# Patient Record
Sex: FEMALE | Race: WHITE | NOT HISPANIC OR LATINO | Employment: FULL TIME | URBAN - METROPOLITAN AREA
[De-identification: names, ages, dates, MRNs, and addresses within clinical notes are randomized per-mention and may not be internally consistent; named-entity substitution may affect disease eponyms.]

---

## 2017-07-31 ENCOUNTER — HOSPITAL ENCOUNTER (OUTPATIENT)
Dept: FAMILY MEDICINE CLINIC | Facility: CLINIC | Age: 42
Setting detail: SPECIMEN
Discharge: HOME | End: 2017-07-31
Attending: FAMILY MEDICINE | Admitting: FAMILY MEDICINE

## 2017-07-31 LAB
BASOPHILS # BLD AUTO: 0.1 10*3/UL (ref 0–0.2)
BASOPHILS NFR BLD AUTO: 1 % (ref 0–2)
BILIRUB UR QL STRIP: NEGATIVE MG/DL
CASTS URNS QL MICRO: NORMAL /[LPF]
CHOLEST SERPL-MCNC: 257 MG/DL
CHOLEST/HDLC SERPL: 4.1 {RATIO}
COLOR UR: YELLOW
CONV BACTERIA IN URINE MICRO: NEGATIVE
CONV CLARITY OF URINE: CLEAR
CONV HYALINE CASTS IN URINE MICRO: 0 /[LPF] (ref 0–5)
CONV LDL CHOLESTEROL DIRECT: 150 MG/DL (ref 0–100)
CONV PROTEIN IN URINE BY AUTOMATED TEST STRIP: NEGATIVE MG/DL
CONV SMALL ROUND CELLS: NORMAL /[HPF]
CONV UROBILINOGEN IN URINE BY AUTOMATED TEST STRIP: 0.2 MG/DL
CULTURE INDICATED?: NORMAL
DIFFERENTIAL METHOD BLD: (no result)
EOSINOPHIL # BLD AUTO: 0.1 10*3/UL (ref 0–0.3)
EOSINOPHIL # BLD AUTO: 1 % (ref 0–3)
ERYTHROCYTE [DISTWIDTH] IN BLOOD BY AUTOMATED COUNT: 13.6 % (ref 11.5–14.5)
ERYTHROCYTE [SEDIMENTATION RATE] IN BLOOD BY WESTERGREN METHOD: 18 MM/HR (ref 0–20)
GLUCOSE UR QL: NEGATIVE MG/DL
HCT VFR BLD AUTO: 41.9 % (ref 35–49)
HDLC SERPL-MCNC: 63 MG/DL
HGB BLD-MCNC: 14 G/DL (ref 12–15)
HGB UR QL STRIP: NEGATIVE
KETONES UR QL STRIP: NEGATIVE MG/DL
LDLC/HDLC SERPL: 2.4 {RATIO}
LEUKOCYTE ESTERASE UR QL STRIP: NEGATIVE
LIPID INTERPRETATION: ABNORMAL
LYMPHOCYTES # BLD AUTO: 2.2 10*3/UL (ref 0.8–4.8)
LYMPHOCYTES NFR BLD AUTO: 34 % (ref 18–42)
MCH RBC QN AUTO: 30.4 PG (ref 26–32)
MCHC RBC AUTO-ENTMCNC: 33.3 G/DL (ref 32–36)
MCV RBC AUTO: 91.2 FL (ref 80–94)
MONOCYTES # BLD AUTO: 0.7 10*3/UL (ref 0.1–1.3)
MONOCYTES NFR BLD AUTO: 10 % (ref 2–11)
NEUTROPHILS # BLD AUTO: 3.5 10*3/UL (ref 2.3–8.6)
NEUTROPHILS NFR BLD AUTO: 54 % (ref 50–75)
NITRITE UR QL STRIP: NEGATIVE
NRBC BLD AUTO-RTO: 0 /100{WBCS}
NRBC/RBC NFR BLD MANUAL: 0 10*3/UL
PH UR STRIP.AUTO: 7 [PH] (ref 4.5–8)
PLATELET # BLD AUTO: 259 10*3/UL (ref 150–450)
PMV BLD AUTO: 9.6 FL (ref 7.4–10.4)
RBC # BLD AUTO: 4.6 10*6/UL (ref 4–5.4)
RBC #/AREA URNS HPF: 0 /[HPF] (ref 0–3)
SP GR UR: 1.01 (ref 1–1.03)
SPERM URNS QL MICRO: NORMAL /[HPF]
SQUAMOUS SPT QL MICRO: 1 /[HPF] (ref 0–5)
TRIGL SERPL-MCNC: 254 MG/DL
TSH SERPL-ACNC: 1.5 UIU/ML (ref 0.34–5.6)
UNIDENT CRYS URNS QL MICRO: NORMAL /[HPF]
VLDLC SERPL CALC-MCNC: 44.1 MG/DL
WBC # BLD AUTO: 6.5 10*3/UL (ref 4.5–11.5)
WBC #/AREA URNS HPF: 1 /[HPF] (ref 0–5)
YEAST SPEC QL WET PREP: NORMAL /[HPF]

## 2017-08-02 LAB
ANA SER QL IA: NORMAL

## 2018-06-13 ENCOUNTER — HOSPITAL ENCOUNTER (OUTPATIENT)
Dept: PHYSICAL THERAPY | Facility: HOSPITAL | Age: 43
Setting detail: RECURRING SERIES
Discharge: HOME | End: 2018-08-01
Attending: OBSTETRICS & GYNECOLOGY | Admitting: OBSTETRICS & GYNECOLOGY

## 2019-05-24 ENCOUNTER — CONVERSION ENCOUNTER (OUTPATIENT)
Dept: FAMILY MEDICINE CLINIC | Facility: CLINIC | Age: 44
End: 2019-05-24

## 2019-05-24 ENCOUNTER — HOSPITAL ENCOUNTER (OUTPATIENT)
Dept: FAMILY MEDICINE CLINIC | Facility: CLINIC | Age: 44
Setting detail: SPECIMEN
Discharge: HOME | End: 2019-05-24
Attending: FAMILY MEDICINE | Admitting: FAMILY MEDICINE

## 2019-05-24 LAB
ALBUMIN SERPL-MCNC: 4 G/DL (ref 3.5–4.8)
ALBUMIN/GLOB SERPL: 1.4 {RATIO} (ref 1–1.7)
ALP SERPL-CCNC: 39 IU/L (ref 32–91)
ALT SERPL-CCNC: 23 IU/L (ref 14–54)
ANION GAP SERPL CALC-SCNC: 11.2 MMOL/L (ref 10–20)
AST SERPL-CCNC: 22 IU/L (ref 15–41)
BASOPHILS # BLD AUTO: 0.1 10*3/UL (ref 0–0.2)
BASOPHILS NFR BLD AUTO: 1 % (ref 0–2)
BILIRUB SERPL-MCNC: 0.6 MG/DL (ref 0.3–1.2)
BILIRUB UR QL STRIP: NEGATIVE MG/DL
BUN SERPL-MCNC: 12 MG/DL (ref 8–20)
BUN/CREAT SERPL: 15 (ref 5.4–26.2)
CALCIUM SERPL-MCNC: 8.9 MG/DL (ref 8.9–10.3)
CASTS URNS QL MICRO: NORMAL /[LPF]
CHLORIDE SERPL-SCNC: 106 MMOL/L (ref 101–111)
CHOLEST SERPL-MCNC: 213 MG/DL
CHOLEST/HDLC SERPL: 4.2 {RATIO}
COLOR UR: YELLOW
CONV BACTERIA IN URINE MICRO: NEGATIVE
CONV CLARITY OF URINE: CLEAR
CONV CO2: 24 MMOL/L (ref 22–32)
CONV HYALINE CASTS IN URINE MICRO: 1 /[LPF] (ref 0–5)
CONV LDL CHOLESTEROL DIRECT: 139 MG/DL (ref 0–100)
CONV PROTEIN IN URINE BY AUTOMATED TEST STRIP: NEGATIVE MG/DL
CONV SMALL ROUND CELLS: NORMAL /[HPF]
CONV TOTAL PROTEIN: 6.8 G/DL (ref 6.1–7.9)
CONV UROBILINOGEN IN URINE BY AUTOMATED TEST STRIP: 0.2 MG/DL
CREAT UR-MCNC: 0.8 MG/DL (ref 0.4–1)
CULTURE INDICATED?: NORMAL
DIFFERENTIAL METHOD BLD: (no result)
EOSINOPHIL # BLD AUTO: 0.1 10*3/UL (ref 0–0.3)
EOSINOPHIL # BLD AUTO: 2 % (ref 0–3)
ERYTHROCYTE [DISTWIDTH] IN BLOOD BY AUTOMATED COUNT: 13.3 % (ref 11.5–14.5)
GLOBULIN UR ELPH-MCNC: 2.8 G/DL (ref 2.5–3.8)
GLUCOSE SERPL-MCNC: 97 MG/DL (ref 65–99)
GLUCOSE UR QL: NEGATIVE MG/DL
HBA1C MFR BLD: 5.1 % (ref 0–5.6)
HCT VFR BLD AUTO: 40.8 % (ref 35–49)
HDLC SERPL-MCNC: 50 MG/DL
HGB BLD-MCNC: 13.7 G/DL (ref 12–15)
HGB UR QL STRIP: NEGATIVE
KETONES UR QL STRIP: NEGATIVE MG/DL
LDLC/HDLC SERPL: 2.8 {RATIO}
LEUKOCYTE ESTERASE UR QL STRIP: NEGATIVE
LIPID INTERPRETATION: ABNORMAL
LYMPHOCYTES # BLD AUTO: 2 10*3/UL (ref 0.8–4.8)
LYMPHOCYTES NFR BLD AUTO: 31 % (ref 18–42)
MCH RBC QN AUTO: 31.6 PG (ref 26–32)
MCHC RBC AUTO-ENTMCNC: 33.7 G/DL (ref 32–36)
MCV RBC AUTO: 94 FL (ref 80–94)
MONOCYTES # BLD AUTO: 0.6 10*3/UL (ref 0.1–1.3)
MONOCYTES NFR BLD AUTO: 9 % (ref 2–11)
NEUTROPHILS # BLD AUTO: 3.7 10*3/UL (ref 2.3–8.6)
NEUTROPHILS NFR BLD AUTO: 57 % (ref 50–75)
NITRITE UR QL STRIP: NEGATIVE
NRBC BLD AUTO-RTO: 0 /100{WBCS}
NRBC/RBC NFR BLD MANUAL: 0 10*3/UL
PH UR STRIP.AUTO: 6 [PH] (ref 4.5–8)
PLATELET # BLD AUTO: 229 10*3/UL (ref 150–450)
PMV BLD AUTO: 10.2 FL (ref 7.4–10.4)
POTASSIUM SERPL-SCNC: 4.2 MMOL/L (ref 3.6–5.1)
RBC # BLD AUTO: 4.34 10*6/UL (ref 4–5.4)
RBC #/AREA URNS HPF: 1 /[HPF] (ref 0–3)
SODIUM SERPL-SCNC: 137 MMOL/L (ref 136–144)
SP GR UR: 1.02 (ref 1–1.03)
SPERM URNS QL MICRO: NORMAL /[HPF]
SQUAMOUS SPT QL MICRO: 1 /[HPF] (ref 0–5)
TRIGL SERPL-MCNC: 243 MG/DL
TSH SERPL-ACNC: 1.43 UIU/ML (ref 0.34–5.6)
UNIDENT CRYS URNS QL MICRO: NORMAL /[HPF]
VLDLC SERPL CALC-MCNC: 23.8 MG/DL
WBC # BLD AUTO: 6.5 10*3/UL (ref 4.5–11.5)
WBC #/AREA URNS HPF: 1 /[HPF] (ref 0–5)
YEAST SPEC QL WET PREP: NORMAL /[HPF]

## 2019-05-26 LAB — 25(OH)D3 SERPL-MCNC: 47 NG/ML (ref 30–100)

## 2019-06-04 VITALS
OXYGEN SATURATION: 98 % | WEIGHT: 167 LBS | HEART RATE: 74 BPM | SYSTOLIC BLOOD PRESSURE: 112 MMHG | DIASTOLIC BLOOD PRESSURE: 83 MMHG | RESPIRATION RATE: 16 BRPM

## 2019-06-06 NOTE — PROGRESS NOTES
Visit Type:  Annual Physical  Referring Provider:  Olga Carrington MD  Primary Provider:  Olga Carrington MD    CC:  well exam.    History of Present Illness:                 This is 45 y/o female who is coming in today for her annual wellness exam. She is doing well and she denies any issues. Patient denies any chest pain, shortness of breath, dizziness, headaches, nausea, vomiting, or diarrhea. Patient denies any   visual problems, weakness, numbness, or tingling. No swelling reported. Patient has good appetite and denies any weight loss. No rash reported. She denies any urinary issues.      Past Medical History:     Hemochromatosis mutation - Dr. Forbes     Seasonal Allergies     Anxiety Disorder/Depression - Dr. Perez     Hypertension     Headache, Migraine     IBS     [facility] per [facility] - pap and mammograms from there     Hyperlipidemia     Interstitial cystitis     Colonoscopy in 2008     GERD     Tail bone pain/Pelvic floor dysfunction     Tdap in 10/29/2017 - given in the pharmacy    Past Surgical History:     Reviewed history from 01/05/2017 and no changes required:        Appendectomy        Breast Surgery: Implants in 2004        Hiatal hernia w/ TIF 12/28/16     Family History Summary:      Reviewed history Last on 04/04/2018 and no changes required:05/24/2019  Other Family Member - Has FH Heart Disease - Entered On: 11/22/2016  Other Family Member - Has FH Diabetes - Entered On: 11/22/2016  Mother - Has Family History of High Cholesterol - Entered On: 7/16/2015  Mother - Has Family History of Heart Disease - Entered On: 7/16/2015  Mother - Has Family History of Diabetes - Entered On: 7/16/2015    General Comments - FH:  FH Depression  FH Diabetes  FH Heart Disease  FH Hypertension  FH High Cholesterol  FH Other Cancer  FH Psychiatric Care  FH Hemochromatosis (brother)    Social History:     Reviewed history from 11/22/2016 and no changes required:                No Children         Works at the business        Alcohol Use - yes                Smoking History:        Unknown if patient has ever smoked.        Risk Factors:     Smoked Tobacco Use:  Never smoker  Smokeless Tobacco Use:  Never  Passive smoke exposure:  no  Drug use:  no  Alcohol use:  yes  Exercise:  no  Seatbelt use:  100 %  Sun Exposure:  occasionally      Review of Systems     General       Denies fever, chills and fatigue.    Eyes       Denies double vision, eye irritation, vision loss - both eyes and blurring.    ENT       Denies earache, nasal congestion, hoarseness, sore throat and Post-nasal drainage.    CV       Denies near fainting, chest pain or discomfort, racing/skipping heart beats, lightheadedness, shortness of breath with exertion and palpitations.    Resp       Denies shortness of breath, chest discomfort and wheezing.    GI       Denies nausea, vomiting, abdominal pain and diarrhea.           Denies blood in urine, urinary frequency, urinary urgency and kidney pain.    MS       Denies joint pain, joint swelling, back pain, stiffness and arthritis.    Derm       Denies dryness, itching and rash.    Psych       Denies anxiety, mental problems and depression.    Endo       Denies cold intolerance, heat intolerance, excessive urination and excessive   thirst.    Heme       Denies bleeding, skin discoloration and fevers.    Allergy       Denies hives or rash and seasonal allergies.      Vital Signs:    Patient Profile:    44 Years Old Female  Height:     65 inches  Weight:     167 pounds  BMI:        27.79     O2 Sat:     98 %  Temp:       98.2 degrees F oral  Pulse rate: 74 / minute  Pulse rhythm:   regular  Resp:       16 per minute  BP Sittin / 83  (left arm)    Cuff size:  regular      Problems: Active problems were reviewed with the patient during this visit.  Medications: Medications were reviewed with the patient during this visit.  Allergies: Allergies were reviewed with the patient during this  visit.  No Known Allergy.        Vitals Entered By: [facility] Muniz [facility] (May 24, 2019 8:18 AM)      Physical Exam    General:      well developed, well nourished, in no acute distress.    Head:      normocephalic and atraumatic.    Eyes:      PERRL/EOM intact, conjunctiva and sclera clear with out nystagmus.    Ears:      TM's intact and clear with normal canals with grossly normal hearing.    Mouth:      no deformity or lesions with good dentition.    Neck:      no masses, thyromegaly, or abnormal cervical nodes.    Lungs:      clear to auscultation bilaterally. No crackles or wheezes noted, good air movement. No retractions or accessory muscle use.    Heart:      non-displaced PMI, chest non-tender; regular rate and rhythm, S1, S2 without murmurs, rubs, or gallops  Abdomen:      soft, non-tender, non-distended, positive bowel sounds. No rebound tenderness, no guarding. No organomegaly noted. Normal percussion.    Msk:      no deformity or scoliosis noted of thoracic or lumbar spine.    Extremities:      no pedal edema.    Neurologic:      CN II-XII gross intact.    Skin:      intact without lesions or rashes.    Cervical Nodes:      no significant adenopathy.    Psych:      alert and cooperative; normal mood and affect; normal attention span and concentration.        Blood Pressure:  Today's BP: 112/83 mm Hg    Labwork:   Most Recent Lab Results:   LDL: 150 mg/dL 07/31/2017  HbA1c: : 5.1 % 07/31/2017    Active Medications (reviewed today):  AMOXICILLIN 875 MG ORAL TABLET (AMOXICILLIN) Take one (1) tablet by mouth twice a day  MEDROL 4 MG ORAL TABLET THERAPY PACK (METHYLPREDNISOLONE) use as directed  TRIAMCINOLONE ACETONIDE 0.1 % EXTERNAL CREAM (TRIAMCINOLONE ACETONIDE) apply to the affected area BID as needed  VITAMIN D 2000 UNIT ORAL CAPSULE (CHOLECALCIFEROL) take 1 capsule PO once a day  GARLIQUE TABLET DELAYED RELEASE (GARLIC TBEC)   PHAZYME CAPSULE (SIMETHICONE CAPS)   MAGNESIUM CAPSULE (MAGNESIUM OXIDE CAPS)   GNP  GINGKO BILOBA EXTRACT CAPSULE (GINKGO BILOBA CAPS)   DAILY MULTIVITAMIN ORAL CAPSULE (MULTIPLE VITAMINS-MINERALS)   PRELIEF TABLET (CALCIUM GLYCEROPHOSPHATE TABS)   TRINTELLIX 10 MG ORAL TABLET (VORTIOXETINE HBR) Take 1 tablet by mouth daily  VITAMIN B-12 1000 MCG SUBLINGUAL TABLET SUBLINGUAL (CYANOCOBALAMIN) Take 1 tablet by mouth daily  FLONASE 50 MCG/ACT NASAL SUSPENSION (FLUTICASONE PROPIONATE) use 2 sprays to each nostril once a day  ZYRTEC ALLERGY 10 MG ORAL CAPSULE (CETIRIZINE HCL) Take 1 tablet by mouth daily  ALIGN 4 MG ORAL CAPSULE (PROBIOTIC PRODUCT) Take 1 tablet by mouth daily    Current Allergies (reviewed today):  No known allergies        Impression & Recommendations:    Problem # 1:  PREVENTIVE HEALTH CARE EXAM (ICD-V70.0) (WVO29-H80.00)  Assessment: Unchanged  Wellness exam was done today - see above for details. I will get labs. She gets her pap smears per GYN. Healthy life style was discussed and encouraged today. Patient was encouraged to exercise regularly and increase PO water intake for good health.   Patient also advised to take multivitamin a day.    Orders:  [facility] COMPREHENSIVE METABOLIC PANEL (CMP) (MPC)  [facility] LIPID PANEL (LIPID)  [facility] CBC W/DIFF; PATH REVIEW IF INDICATED (CBC)  [facility] VITAMIN D TOTAL (VITD)  [facility] THYROID STIMULATING HORMONE (TSH) (TSH)  [facility] URINALYSIS W/ REFLEX TO CULTURE (UAC)  [facility] HEMOGLOBIN A1c (A1DCA)  Est. Well Exam 40-64 yrs. (00636)      Problem # 2:  Screening mammogram (ICD-V76.12) (BWX84-D63.31)  Assessment: Unchanged    Orders:  BILATERAL SCREENING MAMMOGRAM (CPT-22423)      Other Orders:  EKG (In House) (50212)        Patient Instructions:  1)  Schedule follow up appointment as needed.  2)  Discussed importance of regular exercise and recommended starting or continuing a regular exercise program for good health.                      Medication Administration    Orders Added:  1)  [facility] COMPREHENSIVE METABOLIC PANEL (CMP) [MPC]  2)  [facility] LIPID PANEL [LIPID]  3)  [facility] CBC  W/DIFF; PATH REVIEW IF INDICATED [CBC]  4)  [facility] VITAMIN D TOTAL [VITD]  5)  [facility] THYROID STIMULATING HORMONE (TSH) [TSH]  6)  [facility] URINALYSIS W/ REFLEX TO CULTURE [UAC]  7)  [facility] HEMOGLOBIN A1c [A1DCA]  8)  BILATERAL SCREENING MAMMOGRAM [CPT-[id number]]  9)  Est. Well Exam 40-64 yrs. [[id number]]  10)  EKG (In House) [06554]      EKG Report    Procedure date:  05/24/2019    Findings:       Normal sinus rhythm with rate of 75/min  CA:  169 ms  QRS:  88 ms  QT:  379 ms  QTc:  407 ms  Axis:  normal          Electronically signed by Olga Carrington MD on 05/24/2019 at 12:03 PM  ________________________________________________________________________       Disclaimer: Converted Note message may not contain all data elements that existed in the legacy source system. Please see [facility] for the original note details.

## 2021-06-16 ENCOUNTER — OFFICE VISIT (OUTPATIENT)
Dept: FAMILY MEDICINE CLINIC | Facility: CLINIC | Age: 46
End: 2021-06-16

## 2021-06-16 ENCOUNTER — LAB (OUTPATIENT)
Dept: FAMILY MEDICINE CLINIC | Facility: CLINIC | Age: 46
End: 2021-06-16

## 2021-06-16 VITALS
TEMPERATURE: 97.1 F | HEART RATE: 77 BPM | SYSTOLIC BLOOD PRESSURE: 119 MMHG | BODY MASS INDEX: 27.8 KG/M2 | OXYGEN SATURATION: 97 % | RESPIRATION RATE: 16 BRPM | DIASTOLIC BLOOD PRESSURE: 82 MMHG | WEIGHT: 173 LBS | HEIGHT: 66 IN

## 2021-06-16 DIAGNOSIS — Z12.11 COLON CANCER SCREENING: ICD-10-CM

## 2021-06-16 DIAGNOSIS — Z00.00 PREVENTATIVE HEALTH CARE: Primary | ICD-10-CM

## 2021-06-16 DIAGNOSIS — R20.0 BILATERAL ARM NUMBNESS AND TINGLING WHILE SLEEPING: ICD-10-CM

## 2021-06-16 DIAGNOSIS — R06.83 SNORING: ICD-10-CM

## 2021-06-16 DIAGNOSIS — Z12.31 VISIT FOR SCREENING MAMMOGRAM: ICD-10-CM

## 2021-06-16 DIAGNOSIS — R20.2 BILATERAL ARM NUMBNESS AND TINGLING WHILE SLEEPING: ICD-10-CM

## 2021-06-16 PROBLEM — E78.5 HYPERLIPIDEMIA: Status: ACTIVE | Noted: 2021-06-16

## 2021-06-16 PROBLEM — I10 HYPERTENSION: Status: ACTIVE | Noted: 2021-06-16

## 2021-06-16 PROBLEM — F32.A DEPRESSION: Status: ACTIVE | Noted: 2021-06-16

## 2021-06-16 PROBLEM — G43.909 HEADACHE, MIGRAINE: Status: ACTIVE | Noted: 2021-06-16

## 2021-06-16 PROBLEM — F41.9 ANXIETY DISORDER: Status: ACTIVE | Noted: 2021-06-16

## 2021-06-16 PROBLEM — K58.9 IRRITABLE BOWEL SYNDROME: Status: ACTIVE | Noted: 2021-06-16

## 2021-06-16 LAB
25(OH)D3 SERPL-MCNC: 51.6 NG/ML (ref 30–100)
ALBUMIN SERPL-MCNC: 4.7 G/DL (ref 3.5–5.2)
ALBUMIN/GLOB SERPL: 1.7 G/DL
ALP SERPL-CCNC: 59 U/L (ref 39–117)
ALT SERPL W P-5'-P-CCNC: 36 U/L (ref 1–33)
ANION GAP SERPL CALCULATED.3IONS-SCNC: 8.6 MMOL/L (ref 5–15)
AST SERPL-CCNC: 24 U/L (ref 1–32)
BASOPHILS # BLD AUTO: 0.07 10*3/MM3 (ref 0–0.2)
BASOPHILS NFR BLD AUTO: 1.2 % (ref 0–1.5)
BILIRUB SERPL-MCNC: 0.2 MG/DL (ref 0–1.2)
BILIRUB UR QL STRIP: NEGATIVE
BUN SERPL-MCNC: 22 MG/DL (ref 6–20)
BUN/CREAT SERPL: 27.5 (ref 7–25)
CALCIUM SPEC-SCNC: 9.4 MG/DL (ref 8.6–10.5)
CHLORIDE SERPL-SCNC: 101 MMOL/L (ref 98–107)
CHOLEST SERPL-MCNC: 254 MG/DL (ref 0–200)
CLARITY UR: CLEAR
CO2 SERPL-SCNC: 28.4 MMOL/L (ref 22–29)
COLOR UR: YELLOW
CREAT SERPL-MCNC: 0.8 MG/DL (ref 0.57–1)
DEPRECATED RDW RBC AUTO: 42.2 FL (ref 37–54)
EOSINOPHIL # BLD AUTO: 0.22 10*3/MM3 (ref 0–0.4)
EOSINOPHIL NFR BLD AUTO: 3.8 % (ref 0.3–6.2)
ERYTHROCYTE [DISTWIDTH] IN BLOOD BY AUTOMATED COUNT: 12.5 % (ref 12.3–15.4)
GFR SERPL CREATININE-BSD FRML MDRD: 77 ML/MIN/1.73
GLOBULIN UR ELPH-MCNC: 2.7 GM/DL
GLUCOSE SERPL-MCNC: 103 MG/DL (ref 65–99)
GLUCOSE UR STRIP-MCNC: NEGATIVE MG/DL
HCT VFR BLD AUTO: 42.7 % (ref 34–46.6)
HCV AB SER DONR QL: NORMAL
HDLC SERPL-MCNC: 59 MG/DL (ref 40–60)
HGB BLD-MCNC: 14.3 G/DL (ref 12–15.9)
HGB UR QL STRIP.AUTO: NEGATIVE
IMM GRANULOCYTES # BLD AUTO: 0.01 10*3/MM3 (ref 0–0.05)
IMM GRANULOCYTES NFR BLD AUTO: 0.2 % (ref 0–0.5)
KETONES UR QL STRIP: NEGATIVE
LDLC SERPL CALC-MCNC: 164 MG/DL (ref 0–100)
LDLC/HDLC SERPL: 2.72 {RATIO}
LEUKOCYTE ESTERASE UR QL STRIP.AUTO: NEGATIVE
LYMPHOCYTES # BLD AUTO: 2.01 10*3/MM3 (ref 0.7–3.1)
LYMPHOCYTES NFR BLD AUTO: 34.7 % (ref 19.6–45.3)
MCH RBC QN AUTO: 31.6 PG (ref 26.6–33)
MCHC RBC AUTO-ENTMCNC: 33.5 G/DL (ref 31.5–35.7)
MCV RBC AUTO: 94.3 FL (ref 79–97)
MONOCYTES # BLD AUTO: 0.65 10*3/MM3 (ref 0.1–0.9)
MONOCYTES NFR BLD AUTO: 11.2 % (ref 5–12)
NEUTROPHILS NFR BLD AUTO: 2.83 10*3/MM3 (ref 1.7–7)
NEUTROPHILS NFR BLD AUTO: 48.9 % (ref 42.7–76)
NITRITE UR QL STRIP: NEGATIVE
NRBC BLD AUTO-RTO: 0 /100 WBC (ref 0–0.2)
PH UR STRIP.AUTO: 6.5 [PH] (ref 5–8)
PLATELET # BLD AUTO: 261 10*3/MM3 (ref 140–450)
PMV BLD AUTO: 11.6 FL (ref 6–12)
POTASSIUM SERPL-SCNC: 4.1 MMOL/L (ref 3.5–5.2)
PROT SERPL-MCNC: 7.4 G/DL (ref 6–8.5)
PROT UR QL STRIP: NEGATIVE
RBC # BLD AUTO: 4.53 10*6/MM3 (ref 3.77–5.28)
SODIUM SERPL-SCNC: 138 MMOL/L (ref 136–145)
SP GR UR STRIP: 1.02 (ref 1–1.03)
TRIGL SERPL-MCNC: 172 MG/DL (ref 0–150)
TSH SERPL DL<=0.05 MIU/L-ACNC: 1.53 UIU/ML (ref 0.27–4.2)
UROBILINOGEN UR QL STRIP: NORMAL
VIT B12 BLD-MCNC: 1530 PG/ML (ref 211–946)
VLDLC SERPL-MCNC: 31 MG/DL (ref 5–40)
WBC # BLD AUTO: 5.79 10*3/MM3 (ref 3.4–10.8)

## 2021-06-16 PROCEDURE — 80061 LIPID PANEL: CPT | Performed by: FAMILY MEDICINE

## 2021-06-16 PROCEDURE — 85025 COMPLETE CBC W/AUTO DIFF WBC: CPT | Performed by: FAMILY MEDICINE

## 2021-06-16 PROCEDURE — 99396 PREV VISIT EST AGE 40-64: CPT | Performed by: FAMILY MEDICINE

## 2021-06-16 PROCEDURE — 82607 VITAMIN B-12: CPT | Performed by: FAMILY MEDICINE

## 2021-06-16 PROCEDURE — 80053 COMPREHEN METABOLIC PANEL: CPT | Performed by: FAMILY MEDICINE

## 2021-06-16 PROCEDURE — 81003 URINALYSIS AUTO W/O SCOPE: CPT | Performed by: FAMILY MEDICINE

## 2021-06-16 PROCEDURE — 36415 COLL VENOUS BLD VENIPUNCTURE: CPT | Performed by: FAMILY MEDICINE

## 2021-06-16 PROCEDURE — 82306 VITAMIN D 25 HYDROXY: CPT | Performed by: FAMILY MEDICINE

## 2021-06-16 PROCEDURE — 84443 ASSAY THYROID STIM HORMONE: CPT | Performed by: FAMILY MEDICINE

## 2021-06-16 PROCEDURE — 86803 HEPATITIS C AB TEST: CPT | Performed by: FAMILY MEDICINE

## 2021-06-16 RX ORDER — OCTISALATE, AVOBENZONE, HOMOSALATE, AND OCTOCRYLENE 29.4; 29.4; 49; 25.48 MG/ML; MG/ML; MG/ML; MG/ML
LOTION TOPICAL
COMMUNITY
Start: 2013-08-07

## 2021-06-16 RX ORDER — MAGNESIUM 200 MG
TABLET ORAL
COMMUNITY
Start: 2015-07-16

## 2021-06-16 RX ORDER — MULTIVIT-MIN/IRON/FOLIC ACID/K 18-600-40
CAPSULE ORAL
COMMUNITY
Start: 2017-07-31

## 2021-06-16 RX ORDER — FLUTICASONE PROPIONATE 50 MCG
SPRAY, SUSPENSION (ML) NASAL
COMMUNITY
Start: 2012-12-17

## 2021-06-16 RX ORDER — CETIRIZINE HYDROCHLORIDE 10 MG/1
CAPSULE, LIQUID FILLED ORAL
COMMUNITY
Start: 2012-12-17

## 2021-06-16 RX ORDER — VORTIOXETINE 10 MG/1
1 TABLET, FILM COATED ORAL DAILY
COMMUNITY
Start: 2021-05-18 | End: 2022-09-14

## 2021-06-16 RX ORDER — PYRIDOXINE HCL (VITAMIN B6) 100 MG
100 TABLET ORAL 2 TIMES DAILY
COMMUNITY
End: 2022-09-14

## 2021-06-16 RX ORDER — CHLORAL HYDRATE 500 MG
CAPSULE ORAL
COMMUNITY
End: 2022-09-14

## 2021-06-16 RX ORDER — MV-MIN/FOLIC/VIT K/LYCOP/COQ10 200-100MCG
CAPSULE ORAL
COMMUNITY
Start: 2016-11-22 | End: 2022-09-14

## 2021-06-16 NOTE — PROGRESS NOTES
Subjective   Chief Complaint   Patient presents with   • Annual Exam     Maggie Alvarez is a 46 y.o. female.     Patient Care Team:  Olga Carrington MD as PCP - General (Family Medicine)  Juan Jose Forbes MD as Consulting Physician (Gastroenterology)  Clover Perez MD (Psychiatry)  Makenna Dash MD as Consulting Physician (Obstetrics and Gynecology)    She is coming in today for her annual wellness exam.  She reports to be doing overall pretty good, however she has some concerns she would like to discuss today.  She tells me that for several years she has been experiencing some numbness and tingling in both arms.  She was previously seen for that by her medical doctor over 12 years ago and advised to start taking vitamin B6.  She was also seen by Ortho about 4 years ago.  She does not recall to have nerve conduction study done.  She also has noted having some issues with sleeping and she has been snoring at night.  She wakes up in the morning feeling unrefreshed.  She also has been having some headaches and dizziness on and off.  She also reports some pain in both of her hips on and off.  She is not very physically active.  Her job is pretty sedentary. Patient does not report any chest pain, shortness of breath, dizziness, nausea, vomiting, or diarrhea, visual issues. No urinary issues reported like urgency, frequency, or discomfort upon urination.  No significant weight changes reported.  No swelling reported.  No rashes or any other skin issues reported. No emotional issues or insomnia.       The following portions of the patient's history were reviewed and updated as appropriate: allergies, current medications, past family history, past medical history, past social history, past surgical history and problem list.  Past Medical History:   Diagnosis Date   • Allergic rhinitis    • Anxiety    • Depression    • Hemochromatosis    • Hyperlipidemia    • Hypertension    • IBS (irritable bowel  syndrome)    • Interstitial cystitis    • Migraines    • Pelvic floor dysfunction      Past Surgical History:   Procedure Laterality Date   • APPENDECTOMY     • BREAST IMPLANT SURGERY     • HIATAL HERNIA REPAIR  12/28/2016    with DARRELL Pillai     The patient has a family history of  Family History   Problem Relation Age of Onset   • Hyperlipidemia Mother    • Diabetes Mother    • Heart disease Mother    • Hemochromatosis Brother      Social History     Socioeconomic History   • Marital status:      Spouse name: Not on file   • Number of children: Not on file   • Years of education: Not on file   • Highest education level: Not on file   Tobacco Use   • Smoking status: Never Smoker   • Smokeless tobacco: Never Used   Substance and Sexual Activity   • Alcohol use: Yes   • Drug use: Never   • Sexual activity: Defer       Review of Systems   Constitutional: Negative for activity change, appetite change, chills, fatigue, fever, unexpected weight gain and unexpected weight loss.   HENT: Negative for congestion, ear pain, hearing loss, nosebleeds, postnasal drip, swollen glands, tinnitus and trouble swallowing.    Eyes: Negative for blurred vision, double vision and visual disturbance.   Respiratory: Negative for cough, choking, chest tightness, shortness of breath, wheezing and stridor.    Cardiovascular: Negative for chest pain, palpitations and leg swelling.   Gastrointestinal: Negative for abdominal distention, abdominal pain, anal bleeding, constipation, diarrhea, nausea, vomiting and GERD.   Endocrine: Negative for cold intolerance, heat intolerance and polyphagia.   Genitourinary: Negative for dysuria, flank pain, frequency, hematuria and urgency.   Musculoskeletal: Positive for arthralgias. Negative for back pain, gait problem, joint swelling and neck pain.   Skin: Negative for color change, dry skin and skin lesions.   Allergic/Immunologic: Negative for environmental allergies and food allergies.  "  Neurological: Positive for numbness. Negative for dizziness, tremors, speech difficulty, light-headedness, headache and confusion.   Hematological: Negative for adenopathy. Does not bruise/bleed easily.   Psychiatric/Behavioral: Positive for sleep disturbance. Negative for decreased concentration, depressed mood and stress. The patient is not nervous/anxious.      Visit Vitals  /82 (BP Location: Left arm, Patient Position: Sitting, Cuff Size: Adult)   Pulse 77   Temp 97.1 °F (36.2 °C) (Temporal)   Resp 16   Ht 167.6 cm (66\")   Wt 78.5 kg (173 lb)   SpO2 97%   BMI 27.92 kg/m²       Current Outpatient Medications:   •  BL GINKGO BILOBA PO, Take  by mouth., Disp: , Rfl:   •  Calcium Glycerophosphate (PRELIEF PO), PRELIEF TABS, Disp: , Rfl:   •  Cetirizine HCl (ZyrTEC Allergy) 10 MG capsule, ZYRTEC ALLERGY 10 MG CAPS, Disp: , Rfl:   •  Cholecalciferol (Vitamin D) 50 MCG (2000 UT) capsule, VITAMIN D 2000 UNIT CAPS, Disp: , Rfl:   •  Cyanocobalamin (Vitamin B-12) 1000 MCG sublingual tablet, VITAMIN B-12 1000 MCG SUBL, Disp: , Rfl:   •  fluticasone (Flonase) 50 MCG/ACT nasal spray, FLONASE 50 MCG/ACT NASAL SUSPENSION, Disp: , Rfl:   •  MAGNESIUM PO, MAGNESIUM CAPS, Disp: , Rfl:   •  Misc Natural Products (OSTEO BI-FLEX/5-LOXIN ADVANCED PO), Take  by mouth., Disp: , Rfl:   •  Multiple Vitamins-Minerals (Daily Multivitamin) capsule, DAILY MULTIVITAMIN CAPS, Disp: , Rfl:   •  Omega-3 Fatty Acids (fish oil) 1000 MG capsule capsule, Take  by mouth Daily With Breakfast., Disp: , Rfl:   •  Phenylephrine HCl (SUDAFED PE CONGESTION PO), Take  by mouth., Disp: , Rfl:   •  Plant Sterols and Stanols 450 MG tablet, Take  by mouth., Disp: , Rfl:   •  Probiotic Product (Align) 4 MG capsule, ALIGN 4 MG CAPS, Disp: , Rfl:   •  pyridoxine (VITAMIN B-6) 100 MG tablet tablet, Take 100 mg by mouth 2 (two) times a day., Disp: , Rfl:   •  Trintellix 10 MG tablet, Take 1 tablet by mouth Daily. 200001, Disp: , Rfl:     Objective   Physical " Exam  Vitals and nursing note reviewed.   Constitutional:       General: She is not in acute distress.     Appearance: She is well-developed. She is not diaphoretic.   HENT:      Head: Normocephalic and atraumatic.      Right Ear: External ear normal.      Left Ear: External ear normal.   Eyes:      General: No scleral icterus.        Right eye: No discharge.         Left eye: No discharge.      Conjunctiva/sclera: Conjunctivae normal.      Pupils: Pupils are equal, round, and reactive to light.   Neck:      Thyroid: No thyromegaly.      Vascular: No JVD.   Cardiovascular:      Rate and Rhythm: Normal rate and regular rhythm.      Heart sounds: Normal heart sounds. No murmur heard.   No friction rub. No gallop.    Pulmonary:      Effort: Pulmonary effort is normal. No respiratory distress.      Breath sounds: Normal breath sounds. No stridor. No wheezing, rhonchi or rales.   Abdominal:      General: Bowel sounds are normal. There is no distension.      Palpations: Abdomen is soft. There is no mass.      Tenderness: There is no abdominal tenderness. There is no guarding or rebound.      Hernia: No hernia is present.   Musculoskeletal:         General: No swelling, tenderness or deformity. Normal range of motion.      Cervical back: Normal range of motion and neck supple. No muscular tenderness.      Right lower leg: No edema.      Left lower leg: No edema.   Lymphadenopathy:      Cervical: No cervical adenopathy.   Skin:     General: Skin is warm and dry.      Capillary Refill: Capillary refill takes less than 2 seconds.      Coloration: Skin is not pale.      Findings: No bruising, erythema, lesion or rash.   Neurological:      General: No focal deficit present.      Mental Status: She is alert and oriented to person, place, and time.      Cranial Nerves: No cranial nerve deficit.      Sensory: No sensory deficit.      Motor: No weakness.      Coordination: Coordination normal.      Deep Tendon Reflexes: Reflexes  normal.   Psychiatric:         Mood and Affect: Mood normal.         Behavior: Behavior normal.         Judgment: Judgment normal.         Assessment/Plan   Diagnoses and all orders for this visit:    1. Preventative health care (Primary)  -     CBC Auto Differential  -     Comprehensive Metabolic Panel  -     Lipid Panel  -     TSH  -     Vitamin D 25 Hydroxy  -     Urinalysis With Culture If Indicated - Urine, Clean Catch  -     Vitamin B12  -     Hepatitis C Antibody    2. Visit for screening mammogram  -     Mammo Screening Digital Tomosynthesis Bilateral With CAD; Future    3. Colon cancer screening  -     Ambulatory Referral For Screening Colonoscopy    4. Bilateral arm numbness and tingling while sleeping  -     EMG & Nerve Conduction Test; Future    5. Snoring  -     Ambulatory Referral to Sleep Medicine      Wellness exam was done today - see above for details. Healthy life style was reviewed and discussed and re-enforced. Regular exercise and healthy diet were also discussed and recommended.  I will be getting fasting blood work.  I reviewed her concerns and symptoms.  I will be getting EMG due to bilateral arm numbness and tingling.  I will be referring her for sleep study to rule out sleep apnea.  I also reviewed her health maintenance.  Her last mammogram was in 6/2019.  Her last colonoscopy was in 2008 and she was advised to schedule follow-up colonoscopy in order was given, she will be contacting her GI doctor.  She gets her Pap smears through her gynecologist and she will be scheduling the appointment with them.  I also reviewed her immunization and she already completed her COVID-19 vaccination series.                Return in about 1 year (around 6/16/2022) for Annual physical.    Requested Prescriptions      No prescriptions requested or ordered in this encounter

## 2021-06-17 ENCOUNTER — TELEPHONE (OUTPATIENT)
Dept: FAMILY MEDICINE CLINIC | Facility: CLINIC | Age: 46
End: 2021-06-17

## 2021-06-17 NOTE — TELEPHONE ENCOUNTER
Patient submitted a depression questionnaire and marked having suicidal ideations quite frequently. She was just here for physical yesterday and she did not mention that. She is currently being treated for depression and she is seen a psychiatrist. Please call the patient to get more information as this definitely needs attention right away. Thank you.

## 2021-06-17 NOTE — TELEPHONE ENCOUNTER
Spoke with patient, she says she is fine. She is being treated for her depression by Dr Perez and has had low grade depression for close to 30 years so the feeling are nothing new. Has no intention on hurting herself

## 2021-08-09 ENCOUNTER — OFFICE (AMBULATORY)
Dept: URBAN - METROPOLITAN AREA CLINIC 64 | Facility: CLINIC | Age: 46
End: 2021-08-09

## 2021-08-09 VITALS
HEART RATE: 73 BPM | HEIGHT: 66 IN | DIASTOLIC BLOOD PRESSURE: 73 MMHG | SYSTOLIC BLOOD PRESSURE: 120 MMHG | WEIGHT: 175 LBS

## 2021-08-09 DIAGNOSIS — R94.5 ABNORMAL RESULTS OF LIVER FUNCTION STUDIES: ICD-10-CM

## 2021-08-09 DIAGNOSIS — E83.110 HEREDITARY HEMOCHROMATOSIS: ICD-10-CM

## 2021-08-09 DIAGNOSIS — E78.5 HYPERLIPIDEMIA, UNSPECIFIED: ICD-10-CM

## 2021-08-09 PROCEDURE — 99204 OFFICE O/P NEW MOD 45 MIN: CPT | Performed by: INTERNAL MEDICINE

## 2021-11-22 ENCOUNTER — ON CAMPUS - OUTPATIENT (AMBULATORY)
Dept: URBAN - METROPOLITAN AREA HOSPITAL 2 | Facility: HOSPITAL | Age: 46
End: 2021-11-22

## 2021-11-22 VITALS
HEART RATE: 74 BPM | DIASTOLIC BLOOD PRESSURE: 100 MMHG | RESPIRATION RATE: 16 BRPM | TEMPERATURE: 97 F | SYSTOLIC BLOOD PRESSURE: 154 MMHG | HEART RATE: 75 BPM | SYSTOLIC BLOOD PRESSURE: 96 MMHG | DIASTOLIC BLOOD PRESSURE: 74 MMHG | OXYGEN SATURATION: 100 % | HEART RATE: 71 BPM | DIASTOLIC BLOOD PRESSURE: 77 MMHG | DIASTOLIC BLOOD PRESSURE: 67 MMHG | OXYGEN SATURATION: 96 % | WEIGHT: 170 LBS | SYSTOLIC BLOOD PRESSURE: 123 MMHG | RESPIRATION RATE: 18 BRPM | SYSTOLIC BLOOD PRESSURE: 110 MMHG | DIASTOLIC BLOOD PRESSURE: 88 MMHG | SYSTOLIC BLOOD PRESSURE: 120 MMHG | DIASTOLIC BLOOD PRESSURE: 92 MMHG | HEIGHT: 66 IN | SYSTOLIC BLOOD PRESSURE: 124 MMHG | SYSTOLIC BLOOD PRESSURE: 115 MMHG | SYSTOLIC BLOOD PRESSURE: 113 MMHG | HEART RATE: 77 BPM | HEART RATE: 76 BPM | HEART RATE: 70 BPM | OXYGEN SATURATION: 95 % | SYSTOLIC BLOOD PRESSURE: 119 MMHG | SYSTOLIC BLOOD PRESSURE: 145 MMHG | DIASTOLIC BLOOD PRESSURE: 76 MMHG | HEART RATE: 79 BPM | HEART RATE: 80 BPM | DIASTOLIC BLOOD PRESSURE: 96 MMHG | OXYGEN SATURATION: 99 % | HEART RATE: 83 BPM | DIASTOLIC BLOOD PRESSURE: 34 MMHG

## 2021-11-22 DIAGNOSIS — Z12.11 ENCOUNTER FOR SCREENING FOR MALIGNANT NEOPLASM OF COLON: ICD-10-CM

## 2021-11-22 PROCEDURE — 45378 DIAGNOSTIC COLONOSCOPY: CPT | Mod: 33 | Performed by: INTERNAL MEDICINE

## 2022-05-09 ENCOUNTER — OFFICE (AMBULATORY)
Dept: URBAN - METROPOLITAN AREA CLINIC 64 | Facility: CLINIC | Age: 47
End: 2022-05-09

## 2022-05-09 VITALS
HEIGHT: 66 IN | SYSTOLIC BLOOD PRESSURE: 107 MMHG | WEIGHT: 171 LBS | DIASTOLIC BLOOD PRESSURE: 78 MMHG | HEART RATE: 62 BPM

## 2022-05-09 DIAGNOSIS — E83.110 HEREDITARY HEMOCHROMATOSIS: ICD-10-CM

## 2022-05-09 PROCEDURE — 99214 OFFICE O/P EST MOD 30 MIN: CPT | Performed by: INTERNAL MEDICINE

## 2022-06-09 ENCOUNTER — TELEPHONE (OUTPATIENT)
Dept: FAMILY MEDICINE CLINIC | Facility: CLINIC | Age: 47
End: 2022-06-09

## 2022-06-09 DIAGNOSIS — Z12.31 VISIT FOR SCREENING MAMMOGRAM: Primary | ICD-10-CM

## 2022-06-09 NOTE — TELEPHONE ENCOUNTER
I called and notified patient and she will call and schedule her annual Pe after she gets the ,mammogram so she can discuss at her appt. Order faxed to [facility]

## 2022-06-09 NOTE — TELEPHONE ENCOUNTER
Caller: Maggie Alvarez    Relationship: Self    Best call back number: [phone]     What orders are you requesting (i.e. lab or imaging): MAMMOGRAM ORDERS    In what timeframe would the patient need to come in: IN THE NEXT MONTH OR SO    Where will you receive your lab/imaging services: [facility]    Additional notes: [facility] REACHED OUT TO THE PATIENT TO LET HER KNOW IT IS TIME FOR HER NEXT MAMMOGRAM. THEY ALSO TOLD HER THAT SHE COULD GET A BONE DENSITY SCAN THE SAME DAY BUT TO TALK WITH DR. STRANGE TO SEE IF SHE MEETS THE CRITERIA FOR NEEDING ONE.

## 2022-06-09 NOTE — TELEPHONE ENCOUNTER
I put order for screening mammogram into her chart.  She does not really meet criteria for bone density, she is due for her annual wellness exam with me and I would like her to schedule that and we can discuss this in more detail at that time.  Thank you.

## 2022-09-14 ENCOUNTER — LAB (OUTPATIENT)
Dept: FAMILY MEDICINE CLINIC | Facility: CLINIC | Age: 47
End: 2022-09-14

## 2022-09-14 ENCOUNTER — OFFICE VISIT (OUTPATIENT)
Dept: FAMILY MEDICINE CLINIC | Facility: CLINIC | Age: 47
End: 2022-09-14

## 2022-09-14 VITALS
WEIGHT: 173.8 LBS | RESPIRATION RATE: 16 BRPM | OXYGEN SATURATION: 98 % | BODY MASS INDEX: 27.93 KG/M2 | SYSTOLIC BLOOD PRESSURE: 135 MMHG | DIASTOLIC BLOOD PRESSURE: 96 MMHG | TEMPERATURE: 98 F | HEIGHT: 66 IN | HEART RATE: 70 BPM

## 2022-09-14 DIAGNOSIS — M25.50 ARTHRALGIA, UNSPECIFIED JOINT: ICD-10-CM

## 2022-09-14 DIAGNOSIS — Z00.00 PREVENTATIVE HEALTH CARE: Primary | ICD-10-CM

## 2022-09-14 LAB — CHROMATIN AB SERPL-ACNC: <10 IU/ML (ref 0–14)

## 2022-09-14 PROCEDURE — 86038 ANTINUCLEAR ANTIBODIES: CPT | Performed by: FAMILY MEDICINE

## 2022-09-14 PROCEDURE — 99396 PREV VISIT EST AGE 40-64: CPT | Performed by: FAMILY MEDICINE

## 2022-09-14 PROCEDURE — 36415 COLL VENOUS BLD VENIPUNCTURE: CPT

## 2022-09-14 PROCEDURE — 86200 CCP ANTIBODY: CPT | Performed by: FAMILY MEDICINE

## 2022-09-14 PROCEDURE — 86431 RHEUMATOID FACTOR QUANT: CPT | Performed by: FAMILY MEDICINE

## 2022-09-14 RX ORDER — MELOXICAM 15 MG/1
15 TABLET ORAL DAILY
Qty: 30 TABLET | Refills: 0 | Status: SHIPPED | OUTPATIENT
Start: 2022-09-14

## 2022-09-14 RX ORDER — LEVOMEFOLATE CALCIUM 15 MG
15 TABLET ORAL DAILY
COMMUNITY

## 2022-09-14 RX ORDER — CALCIUM CARBONATE 750 MG/1
750 TABLET, CHEWABLE ORAL DAILY
COMMUNITY

## 2022-09-14 RX ORDER — BIOTIN 5 MG
1000 TABLET ORAL
COMMUNITY

## 2022-09-14 NOTE — PROGRESS NOTES
Subjective   Chief Complaint   Patient presents with   • Annual Exam     Maggie Alvarez is a 47 y.o. female.     Patient Care Team:  Olga Carrington MD as PCP - General (Family Medicine)  Juan Jose Forbes MD as Consulting Physician (Gastroenterology)  Clover Perez MD (Psychiatry)  Makenna Dash MD as Consulting Physician (Obstetrics and Gynecology)    She is coming in today for her annual wellness exam.  She reports having issues with pain in multiple different joints on and off for quite some time.  She was even seen by her orthopedist in June of this year due to pain in the left knee.  She had cortisone shot done which helped, but then pain started coming back.  She even had x-ray done at the orthopedist office.  At the follow-up appointment due to worsening of the pain and some pain in the back of the calf she even had venous Doppler ultrasound done which was negative for DVT per patient report.  She has tried some Aleve over-the-counter which helps her, but she is not really sure if it is safe for her to continue taking anti-inflammatory on daily basis. Patient does not report any chest pain, shortness of breath, dizziness, nausea, vomiting, or diarrhea, visual issues, headaches, numbness or tingling. No urinary issues reported like urgency, frequency, or discomfort upon urination.  No significant weight changes reported.  No swelling reported.  No rashes or any other skin issues reported. No emotional issues or insomnia.       The following portions of the patient's history were reviewed and updated as appropriate: allergies, current medications, past family history, past medical history, past social history, past surgical history and problem list.  Past Medical History:   Diagnosis Date   • Allergic 2001    indoor/outdoor year round   • Allergic rhinitis    • Anxiety    • Arthritis 2015    thumbs/knees   • Depression    • GERD (gastroesophageal reflux disease) 2008    Had surgery in  2016 to correct   • Hemochromatosis    • History of medical problems Hemachromatosis, Interstitial Cystitis, Vulvodynia   • Hyperlipidemia    • Hypertension    • IBS (irritable bowel syndrome)    • Interstitial cystitis    • Migraines    • Pelvic floor dysfunction      Past Surgical History:   Procedure Laterality Date   • APPENDECTOMY     • BREAST IMPLANT SURGERY     • BREAST SURGERY  2004   • COLONOSCOPY  2008, 2021   • HIATAL HERNIA REPAIR  12/28/2016    with DARRELL Pillai     The patient has a family history of  Family History   Problem Relation Age of Onset   • Hyperlipidemia Mother    • Diabetes Mother    • Heart disease Mother         congestive heart failure   • Depression Mother    • Miscarriages / Stillbirths Mother    • Vision loss Mother         cataract surgery   • Other Mother         dermatomyositis, IBS   • Hemochromatosis Brother    • Diabetes Father    • Other Father         GERD   • Diabetes Maternal Grandfather    • Heart disease Maternal Grandfather         bypass surgery   • Arthritis Maternal Grandmother    • Depression Maternal Grandmother    • Hearing loss Maternal Grandmother    • Hyperlipidemia Maternal Grandmother    • Miscarriages / Stillbirths Maternal Grandmother    • Arthritis Paternal Grandmother    • Heart disease Paternal Grandmother         bypass surgery   • Hyperlipidemia Paternal Grandmother    • Stroke Paternal Grandmother    • Cancer Maternal Aunt         breast cancer   • Diabetes Maternal Aunt    • Heart disease Maternal Aunt         bypass surgery   • Hyperlipidemia Maternal Aunt    • Other Sister         dual pulmonary embolism   • Other Brother         ulcerative colitis, sinus surgery, hemachromatosis, GERD     Social History     Socioeconomic History   • Marital status:    Tobacco Use   • Smoking status: Never Smoker   • Smokeless tobacco: Never Used   • Tobacco comment: Around heavy smoke environments some years   Substance and Sexual Activity   • Alcohol  "use: Yes     Alcohol/week: 4.0 standard drinks     Types: 4 Cans of beer per week     Comment: Not every week   • Drug use: Never   • Sexual activity: Not Currently     Partners: Male     Birth control/protection: None     Comment: Same partner 10 years       Review of Systems   Constitutional: Negative for activity change, appetite change, chills, fatigue, fever, unexpected weight gain and unexpected weight loss.   HENT: Negative for congestion, ear pain, hearing loss, nosebleeds, postnasal drip, swollen glands, tinnitus and trouble swallowing.    Eyes: Negative for blurred vision, double vision and visual disturbance.   Respiratory: Negative for cough, choking, chest tightness, shortness of breath, wheezing and stridor.    Cardiovascular: Negative for chest pain, palpitations and leg swelling.   Gastrointestinal: Negative for abdominal distention, abdominal pain, anal bleeding, constipation, diarrhea, nausea, vomiting and GERD.   Endocrine: Negative for cold intolerance, heat intolerance and polyphagia.   Genitourinary: Negative for dysuria, flank pain, frequency, hematuria and urgency.   Musculoskeletal: Positive for arthralgias. Negative for back pain, gait problem, joint swelling and neck pain.   Skin: Negative for color change, dry skin and skin lesions.   Allergic/Immunologic: Negative for environmental allergies and food allergies.   Neurological: Negative for dizziness, tremors, speech difficulty, light-headedness, numbness, headache and confusion.   Hematological: Negative for adenopathy. Does not bruise/bleed easily.   Psychiatric/Behavioral: Negative for decreased concentration, sleep disturbance, depressed mood and stress.     Visit Vitals  /96 (BP Location: Left arm, Patient Position: Sitting, Cuff Size: Adult)   Pulse 70   Temp 98 °F (36.7 °C)   Resp 16   Ht 167.6 cm (66\")   Wt 78.8 kg (173 lb 12.8 oz)   LMP 11/20/2021   SpO2 98%   BMI 28.05 kg/m²       Current Outpatient Medications:   •  BL " GINKGO BILOBA PO, Take  by mouth., Disp: , Rfl:   •  Cetirizine HCl (ZyrTEC Allergy) 10 MG capsule, ZYRTEC ALLERGY 10 MG CAPS, Disp: , Rfl:   •  Cholecalciferol (Vitamin D) 50 MCG (2000 UT) capsule, VITAMIN D 2000 UNIT CAPS, Disp: , Rfl:   •  Cyanocobalamin (Vitamin B-12) 1000 MCG sublingual tablet, VITAMIN B-12 1000 MCG SUBL, Disp: , Rfl:   •  fluticasone (FLONASE) 50 MCG/ACT nasal spray, FLONASE 50 MCG/ACT NASAL SUSPENSION, Disp: , Rfl:   •  l-methylfolate 15 MG tablet tablet, Take 15 mg by mouth Daily., Disp: , Rfl:   •  Misc Natural Products (OSTEO BI-FLEX/5-LOXIN ADVANCED PO), Take  by mouth., Disp: , Rfl:   •  Phenylephrine HCl (SUDAFED PE CONGESTION PO), Take  by mouth., Disp: , Rfl:   •  Plant Sterols and Stanols 450 MG tablet, Take  by mouth., Disp: , Rfl:   •  Probiotic Product (Align) 4 MG capsule, ALIGN 4 MG CAPS, Disp: , Rfl:   •  calcium carbonate EX (TUMS EX) 750 MG chewable tablet, Chew 750 mg Daily., Disp: , Rfl:   •  Krill Oil 1000 MG capsule, Take 1,000 mg by mouth., Disp: , Rfl:   •  meloxicam (MOBIC) 15 MG tablet, Take 1 tablet by mouth Daily., Disp: 30 tablet, Rfl: 0  •  sertraline (ZOLOFT) 50 MG tablet, Take 50 mg by mouth Daily., Disp: , Rfl:     Objective   Physical Exam  Vitals and nursing note reviewed.   Constitutional:       General: She is not in acute distress.     Appearance: She is well-developed. She is not diaphoretic.   HENT:      Head: Normocephalic and atraumatic.      Right Ear: External ear normal.      Left Ear: External ear normal.   Eyes:      General: No scleral icterus.        Right eye: No discharge.         Left eye: No discharge.      Conjunctiva/sclera: Conjunctivae normal.      Pupils: Pupils are equal, round, and reactive to light.   Neck:      Thyroid: No thyromegaly.      Vascular: No JVD.   Cardiovascular:      Rate and Rhythm: Normal rate and regular rhythm.      Heart sounds: Normal heart sounds. No murmur heard.    No friction rub. No gallop.   Pulmonary:       Effort: Pulmonary effort is normal. No respiratory distress.      Breath sounds: Normal breath sounds. No stridor. No wheezing, rhonchi or rales.   Abdominal:      General: Bowel sounds are normal. There is no distension.      Palpations: Abdomen is soft. There is no mass.      Tenderness: There is no abdominal tenderness. There is no guarding or rebound.      Hernia: No hernia is present.   Musculoskeletal:         General: No swelling, tenderness or deformity. Normal range of motion.      Cervical back: Normal range of motion and neck supple. No muscular tenderness.      Right lower leg: No edema.      Left lower leg: No edema.      Comments: Full range of motion in both knees noted, there is crepitus noted with passive movement in both knees.   Lymphadenopathy:      Cervical: No cervical adenopathy.   Skin:     General: Skin is warm and dry.      Capillary Refill: Capillary refill takes less than 2 seconds.      Coloration: Skin is not pale.      Findings: No bruising, erythema, lesion or rash.   Neurological:      General: No focal deficit present.      Mental Status: She is alert and oriented to person, place, and time.      Cranial Nerves: No cranial nerve deficit.      Sensory: No sensory deficit.      Motor: No weakness.      Coordination: Coordination normal.      Deep Tendon Reflexes: Reflexes normal.   Psychiatric:         Mood and Affect: Mood normal.         Behavior: Behavior normal.         Judgment: Judgment normal.         Assessment & Plan   Diagnoses and all orders for this visit:    1. Preventative health care (Primary)  -     Cancel: CBC Auto Differential  -     Cancel: Comprehensive Metabolic Panel  -     Cancel: Lipid Panel  -     Cancel: Hemoglobin A1c    2. Arthralgia, unspecified joint  -     Rheumatoid Factor; Future  -     Cyclic Citrul Peptide Antibody, IgG / IgA; Future  -     DO; Future    Other orders  -     meloxicam (MOBIC) 15 MG tablet; Take 1 tablet by mouth Daily.  Dispense: 30  tablet; Refill: 0      Wellness exam was done today - see above for details. Healthy life style was reviewed and discussed and re-enforced. Regular exercise and healthy diet were also discussed and recommended.  Patient provided a copy of the fasting blood work done in 5/2022 through her GI office including fasting lipid panel and hemoglobin A1c and these results were reviewed and scanned into the patient's chart.  Her last mammogram was in 8/2022.  Her last colonoscopy was in 11/2021.  She gets her Pap smears done through her gynecologist and reports to be up to speed.  She is vaccinated and boosted against COVID-19.  We also addressed her chronic aches and pains which I suspect are due to osteoarthritis.  She would like to get her inflammatory markers checked to rule out inflammatory arthritis.  We discussed treatment with anti-inflammatories and possible side effects which can be caused by that.  I gave her prescription for meloxicam to try.        Return in about 1 year (around 9/14/2023) for Annual physical.    Requested Prescriptions     Signed Prescriptions Disp Refills   • meloxicam (MOBIC) 15 MG tablet 30 tablet 0     Sig: Take 1 tablet by mouth Daily.

## 2022-09-16 LAB
ANA SER QL: NEGATIVE
CCP IGA+IGG SERPL IA-ACNC: 4 UNITS (ref 0–19)

## 2023-07-17 ENCOUNTER — OFFICE (AMBULATORY)
Dept: URBAN - METROPOLITAN AREA CLINIC 64 | Facility: CLINIC | Age: 48
End: 2023-07-17

## 2023-07-17 VITALS
WEIGHT: 170 LBS | SYSTOLIC BLOOD PRESSURE: 130 MMHG | HEART RATE: 68 BPM | HEIGHT: 66 IN | DIASTOLIC BLOOD PRESSURE: 89 MMHG

## 2023-07-17 DIAGNOSIS — E78.5 HYPERLIPIDEMIA, UNSPECIFIED: ICD-10-CM

## 2023-07-17 DIAGNOSIS — E83.110 HEREDITARY HEMOCHROMATOSIS: ICD-10-CM

## 2023-07-17 PROCEDURE — 99213 OFFICE O/P EST LOW 20 MIN: CPT | Performed by: INTERNAL MEDICINE

## 2023-08-15 ENCOUNTER — TELEPHONE (OUTPATIENT)
Dept: FAMILY MEDICINE CLINIC | Facility: CLINIC | Age: 48
End: 2023-08-15

## 2023-08-15 DIAGNOSIS — Z12.31 VISIT FOR SCREENING MAMMOGRAM: Primary | ICD-10-CM

## 2023-08-15 NOTE — TELEPHONE ENCOUNTER
Caller: Maggie Alvarez    Relationship: Self    Best call back number: [phone]    What orders are you requesting (i.e. lab or imaging): MAMMOGRAM    In what timeframe would the patient need to come in: ASAP    Where will you receive your lab/imaging services: [facility]    Additional notes:     PLEASE CALL TO CONFIRM WITH PATIENT SO SHE CAN SCHEDULE.

## 2023-10-07 ENCOUNTER — HOSPITAL ENCOUNTER (OUTPATIENT)
Facility: HOSPITAL | Age: 48
Discharge: HOME | End: 2023-10-07
Attending: EMERGENCY MEDICINE | Admitting: EMERGENCY MEDICINE
Payer: MEDICAID

## 2023-10-07 VITALS
TEMPERATURE: 98 F | SYSTOLIC BLOOD PRESSURE: 134 MMHG | BODY MASS INDEX: 27.32 KG/M2 | OXYGEN SATURATION: 100 % | WEIGHT: 170 LBS | HEART RATE: 101 BPM | RESPIRATION RATE: 15 BRPM | HEIGHT: 66 IN | DIASTOLIC BLOOD PRESSURE: 85 MMHG

## 2023-10-07 DIAGNOSIS — U07.1 COVID-19: Primary | ICD-10-CM

## 2023-10-07 LAB
FLUAV SUBTYP SPEC NAA+PROBE: NOT DETECTED
FLUBV RNA ISLT QL NAA+PROBE: NOT DETECTED
SARS-COV-2 RNA RESP QL NAA+PROBE: DETECTED
STREP A PCR: NOT DETECTED

## 2023-10-07 PROCEDURE — G0463 HOSPITAL OUTPT CLINIC VISIT: HCPCS | Performed by: EMERGENCY MEDICINE

## 2023-10-07 PROCEDURE — 87636 SARSCOV2 & INF A&B AMP PRB: CPT | Performed by: EMERGENCY MEDICINE

## 2023-10-07 PROCEDURE — 87651 STREP A DNA AMP PROBE: CPT | Performed by: EMERGENCY MEDICINE

## 2023-10-07 RX ORDER — BENZONATATE 200 MG/1
200 CAPSULE ORAL 3 TIMES DAILY PRN
Qty: 15 CAPSULE | Refills: 0 | Status: SHIPPED | OUTPATIENT
Start: 2023-10-07

## 2023-10-07 RX ORDER — ONDANSETRON 4 MG/1
4 TABLET, ORALLY DISINTEGRATING ORAL EVERY 8 HOURS PRN
Qty: 12 TABLET | Refills: 0 | Status: SHIPPED | OUTPATIENT
Start: 2023-10-07

## 2023-10-07 NOTE — Clinical Note
[facility] [place] [address] [place] [address] [place] [address]  Phone: [phone]    Maggie Alvarez was seen and treated in our emergency department on 10/7/2023.  She may return to work on 10/11/2023.         Thank you for choosing [facility].    Keke Becerra MD

## 2023-10-08 NOTE — ED NOTES
Reports symptoms began Thursday evening after spending the day at hospital with her . Woke with fevers/chills, sore throat, headache. Has been nauseous without vomiting.

## 2023-10-08 NOTE — DISCHARGE INSTRUCTIONS
Coronavirus (COVID-19) Discharge Instructions       You were diagnosed with the novel Coronavirus, known as COVID-19.  It is a viral illness that can cause fever, cough and trouble breathing.  Some people may have chills, muscle aches, runny nose, sneezing, sore throat, upset stomach or loose stool.         When leaving [facility], you will be asked to wear a mask. You should wear it until you get home.         When do I need to call the doctor?  Call your doctor if your breathing is getting worse (harder or faster than before or you feel like you are getting less air).  Some people start to feel worse in the second week of their illness, if you start to feel worse at any time in your illness, please call your doctor, who will tell you where to go to be seen.  If you can, put on a facemask before leaving home or before you enter the clinic or hospital.       Get medical attention right away if you develop emergency warning signs of COVID-19 such as: trouble breathing, chest pain or pressure that does not go away, new confusion or not able to wake up, bluish lips or face.         Precautions at home  The virus is spread easily through tiny droplets when you cough or sneeze.  You should take these steps to help prevent the disease from spreading to people in your home and community      1. Self-isolate at home    As advised by the Centers for Disease Control and Prevention (CDC), we ask you to stay in your home and limit contact with others to avoid spreading this virus.         Stay home except to go to the doctor    Do not go to work, school, or public areas, except for getting medical care.  Avoid using public transportation (such as buses), ride-sharing, or taxis.  If you have an upcoming doctor appointment, call the office and tell them that you have COVID-19.       Separate yourself from other people and animals in your home.    Avoid touching other people, including handshaking.  As much as you can, stay in a specific  "room and away from other people in your home.  You should also use a separate bathroom, if available.  Avoid sharing personal household items.  You should not share dishes, drinking glasses, cups, eating utensils, towels, toothpaste, or bedding with other people or pets in your home.  After using these items, they should be washed well with soap and water.  Do not handle pets or other animals while sick.  2. Clean and disinfect    Clean all "high-touch" surfaces every day.    High-touch surfaces include counters, tabletops, doorknobs, bathroom fixtures, toilets, phones, keyboards, tablets, and bedside tables.  Clean any surfaces that may have blood, stool, or body fluids on them. Use a household cleaning spray or wipe, according to the label instructions.  Labels contain instructions for safe and effective use of the cleaning product including precautions you should take when applying the product, such as wearing gloves and making sure you have good air flow in the room during use of the product.       Wash laundry.    Remove and wash clothes or bedding that have blood, stool, or body fluids on them and then wash your hands right away       3. Help stop the spread    Clean your hands often.    Wash your hands with soap and water for at least 20 seconds.              OR    úUse an alcohol-based hand  that contains at least 60% alcohol, covering all surfaces of your hands and rubbing them together until they feel dry.  Wash your hands after blowing your nose, coughing, or sneezing; going to the bathroom, and before eating or preparing food.  Avoid touching your eyes, nose, and mouth with unwashed hands.  Cover your coughs and sneezes.    Cover your mouth and nose with a tissue when you cough or sneeze.  Throw used tissues in a lined trash can; clean your hands right away.  Wear a facemask    You should wear a facemask when you are around other people (e.g., sharing a room or vehicle) or pets and before you " enter a healthcare provider's office.  4. Notify your close contacts    Notify people you have been in contact with. They may need to stay home or be tested.  When can I stop precautions at home?  Your doctor will tell you which criteria to follow below    Isolate yourself for at least 5 days, or longer if directed by your doctor. Do not go out of your place of residence. Do not go to work.   After 5 days (day 0 is the first day of symptoms or the date of the day of the positive test if you do not have symptoms), if you have no symptoms or your symptoms are improving you can return to your normal activities, but should continue to wear a mask when around others for an additional 5 days.  If you are unable to wear a mask when around others, you should isolate for a full 10 days. Stay home longer if directed by your doctor.  You may still need to be in isolation for at least 10 days in a healthcare setting, talk to your doctor for more information.       If you have either been hospitalized with severe COVID-19 disease or you are immunocompromised (have a decreased ability to fight infection), you may spread the virus for a longer period of time after recovery. Please continue self-isolation for twenty (20) days in such an event. Most people do not require retesting to decide when they can be around others.  Manage your stress and anxiety  Being ill can be stressful or cause anxiety. Remember that everyone reacts differently to stressful situations.  Being ill with COVID-19 might be especially stressful because it is a new disease and there is a lot of news coverage. Take breaks from watching, reading, or listening to news stories, including social media.  People with preexisting mental conditions should continue their treatment and be aware of new or worsening symptoms.

## 2023-10-08 NOTE — FS[facility] PROVIDER NOTE
Subjective   History of Present Illness  48 yof complains of fever, chills, sinus congestion, headache and nausea since Thursday night. The patient states all day on Thursday she was in a hospital waiting room. She initially thought she just had a sinus infection but her symptoms continued to get worse so she came to the ER. Pt denies shortness of breath, chest pain, or vomiting.       Review of Systems   Constitutional:  Positive for chills, fatigue and fever.   HENT:  Positive for congestion, rhinorrhea and sore throat.    Respiratory:  Positive for cough. Negative for shortness of breath.    Cardiovascular: Negative.    Gastrointestinal:  Positive for nausea. Negative for diarrhea and vomiting.   Musculoskeletal:  Positive for myalgias.   Neurological:  Positive for headaches.   All other systems reviewed and are negative.      Past Medical History:   Diagnosis Date    Allergic 2001    indoor/outdoor year round    Allergic rhinitis     Anxiety     Arthritis 2015    thumbs/knees    Depression     GERD (gastroesophageal reflux disease) 2008    Had surgery in 2016 to correct    Hemochromatosis     History of medical problems Hemachromatosis, Interstitial Cystitis, Vulvodynia    Hyperlipidemia     Hypertension     IBS (irritable bowel syndrome)     Interstitial cystitis     Migraines     Pelvic floor dysfunction        No Known Allergies    Past Surgical History:   Procedure Laterality Date    APPENDECTOMY      BREAST IMPLANT SURGERY      BREAST SURGERY  2004    COLONOSCOPY  2008, 2021    HIATAL HERNIA REPAIR  12/28/2016    with DARRELL Pillai       Family History   Problem Relation Age of Onset    Hyperlipidemia Mother     Diabetes Mother     Heart disease Mother         congestive heart failure    Depression Mother     Miscarriages / Stillbirths Mother     Vision loss Mother         cataract surgery    Other Mother         dermatomyositis, IBS    Hemochromatosis Brother     Diabetes Father     Other Father          GERD    Diabetes Maternal Grandfather     Heart disease Maternal Grandfather         bypass surgery    Arthritis Maternal Grandmother     Depression Maternal Grandmother     Hearing loss Maternal Grandmother     Hyperlipidemia Maternal Grandmother     Miscarriages / Stillbirths Maternal Grandmother     Arthritis Paternal Grandmother     Heart disease Paternal Grandmother         bypass surgery    Hyperlipidemia Paternal Grandmother     Stroke Paternal Grandmother     Cancer Maternal Aunt         breast cancer    Diabetes Maternal Aunt     Heart disease Maternal Aunt         bypass surgery    Hyperlipidemia Maternal Aunt     Other Sister         dual pulmonary embolism    Other Brother         ulcerative colitis, sinus surgery, hemachromatosis, GERD       Social History     Socioeconomic History    Marital status:    Tobacco Use    Smoking status: Never    Smokeless tobacco: Never    Tobacco comments:     Around heavy smoke environments some years   Substance and Sexual Activity    Alcohol use: Yes     Alcohol/week: 4.0 standard drinks of alcohol     Types: 4 Cans of beer per week     Comment: Not every week    Drug use: Never    Sexual activity: Not Currently     Partners: Male     Birth control/protection: None     Comment: Same partner 10 years           Objective   Physical Exam  Constitutional:       General: She is not in acute distress.     Appearance: She is well-developed. She is not ill-appearing or toxic-appearing.   HENT:      Head: Normocephalic and atraumatic.      Right Ear: Tympanic membrane and ear canal normal.      Left Ear: Tympanic membrane and ear canal normal.      Mouth/Throat:      Mouth: Mucous membranes are moist.      Pharynx: Oropharynx is clear.   Eyes:      Conjunctiva/sclera: Conjunctivae normal.      Pupils: Pupils are equal, round, and reactive to light.   Cardiovascular:      Rate and Rhythm: Normal rate and regular rhythm.   Pulmonary:      Effort: Pulmonary effort is  normal.      Breath sounds: Normal breath sounds.   Musculoskeletal:      Cervical back: Normal range of motion and neck supple.   Skin:     General: Skin is warm and dry.      Capillary Refill: Capillary refill takes less than 2 seconds.   Neurological:      Mental Status: She is alert.         Procedures           ED Course                                           Medical Decision Making  This patient presents with symptoms suspicious for likely viral upper respiratory infection. Based on history and physical doubt sinusitis. COVID test was positive. Do not suspect underlying cardiopulmonary process. I considered, but think unlikely, dangerous causes of this patient's symptoms to include ACS, CHF or COPD exacerbations, pneumonia, pneumothorax. Patient is nontoxic appearing and not in need of emergent medical intervention. Discussed risk/benefits of Paxlovid, patient declined at this time.     Problems Addressed:  COVID-19: complicated acute illness or injury    Risk  Prescription drug management.        Final diagnoses:   COVID-19       ED Disposition  ED Disposition       ED Disposition   Discharge    Condition   Stable    Comment   --               Olga Carrington MD  [address] [facility] [address]  [place] [address]  [phone]    Call   If symptoms worsen         Medication List        New Prescriptions      benzonatate 200 MG capsule  Commonly known as: TESSALON  Take 1 capsule by mouth 3 (Three) Times a Day As Needed for Cough.     ondansetron ODT 4 MG disintegrating tablet  Commonly known as: ZOFRAN-ODT  Place 1 tablet on the tongue Every 8 (Eight) Hours As Needed for Nausea.               Where to Get Your Medications        These medications were sent to [facility]/pharmacy #[id number] [facility], [place] - [address] [place] [address] - [phone]  - [phone] [address] [place] [address]      Hours: 24-hours Phone: [phone]   benzonatate 200 MG capsule  ondansetron ODT 4 MG disintegrating  tablet

## 2024-09-24 ENCOUNTER — TELEPHONE (OUTPATIENT)
Dept: FAMILY MEDICINE CLINIC | Facility: CLINIC | Age: 49
End: 2024-09-24
Payer: COMMERCIAL

## 2024-10-15 ENCOUNTER — LAB (OUTPATIENT)
Dept: FAMILY MEDICINE CLINIC | Facility: CLINIC | Age: 49
End: 2024-10-15
Payer: COMMERCIAL

## 2024-10-15 ENCOUNTER — OFFICE VISIT (OUTPATIENT)
Dept: FAMILY MEDICINE CLINIC | Facility: CLINIC | Age: 49
End: 2024-10-15
Payer: COMMERCIAL

## 2024-10-15 VITALS
BODY MASS INDEX: 23.78 KG/M2 | OXYGEN SATURATION: 96 % | DIASTOLIC BLOOD PRESSURE: 78 MMHG | HEIGHT: 66 IN | RESPIRATION RATE: 18 BRPM | HEART RATE: 80 BPM | TEMPERATURE: 98 F | WEIGHT: 148 LBS | SYSTOLIC BLOOD PRESSURE: 112 MMHG

## 2024-10-15 DIAGNOSIS — Z12.31 VISIT FOR SCREENING MAMMOGRAM: ICD-10-CM

## 2024-10-15 DIAGNOSIS — Z00.00 PREVENTATIVE HEALTH CARE: Primary | ICD-10-CM

## 2024-10-15 DIAGNOSIS — Z13.6 SCREENING FOR CARDIOVASCULAR CONDITION: ICD-10-CM

## 2024-10-15 LAB
25(OH)D3 SERPL-MCNC: 40.9 NG/ML (ref 30–100)
ALBUMIN SERPL-MCNC: 4.5 G/DL (ref 3.5–5.2)
ALBUMIN/GLOB SERPL: 1.9 G/DL
ALP SERPL-CCNC: 61 U/L (ref 39–117)
ALT SERPL W P-5'-P-CCNC: 18 U/L (ref 1–33)
ANION GAP SERPL CALCULATED.3IONS-SCNC: 10.9 MMOL/L (ref 5–15)
AST SERPL-CCNC: 19 U/L (ref 1–32)
BASOPHILS # BLD AUTO: 0.07 10*3/MM3 (ref 0–0.2)
BASOPHILS NFR BLD AUTO: 1.1 % (ref 0–1.5)
BILIRUB SERPL-MCNC: 0.3 MG/DL (ref 0–1.2)
BUN SERPL-MCNC: 10 MG/DL (ref 6–20)
BUN/CREAT SERPL: 11.6 (ref 7–25)
CALCIUM SPEC-SCNC: 9.5 MG/DL (ref 8.6–10.5)
CHLORIDE SERPL-SCNC: 103 MMOL/L (ref 98–107)
CHOLEST SERPL-MCNC: 217 MG/DL (ref 0–200)
CO2 SERPL-SCNC: 25.1 MMOL/L (ref 22–29)
CREAT SERPL-MCNC: 0.86 MG/DL (ref 0.57–1)
DEPRECATED RDW RBC AUTO: 41.1 FL (ref 37–54)
EGFRCR SERPLBLD CKD-EPI 2021: 81.9 ML/MIN/1.73
EOSINOPHIL # BLD AUTO: 0.09 10*3/MM3 (ref 0–0.4)
EOSINOPHIL NFR BLD AUTO: 1.4 % (ref 0.3–6.2)
ERYTHROCYTE [DISTWIDTH] IN BLOOD BY AUTOMATED COUNT: 12.3 % (ref 12.3–15.4)
FERRITIN SERPL-MCNC: 32.2 NG/ML (ref 13–150)
GLOBULIN UR ELPH-MCNC: 2.4 GM/DL
GLUCOSE SERPL-MCNC: 86 MG/DL (ref 65–99)
HBA1C MFR BLD: 5.3 % (ref 4.8–5.6)
HCT VFR BLD AUTO: 40 % (ref 34–46.6)
HDLC SERPL-MCNC: 61 MG/DL (ref 40–60)
HGB BLD-MCNC: 14.1 G/DL (ref 12–15.9)
IMM GRANULOCYTES # BLD AUTO: 0.01 10*3/MM3 (ref 0–0.05)
IMM GRANULOCYTES NFR BLD AUTO: 0.2 % (ref 0–0.5)
IRON 24H UR-MRATE: 124 MCG/DL (ref 37–145)
IRON SATN MFR SERPL: 34 % (ref 20–50)
LDLC SERPL CALC-MCNC: 132 MG/DL (ref 0–100)
LDLC/HDLC SERPL: 2.11 {RATIO}
LYMPHOCYTES # BLD AUTO: 1.5 10*3/MM3 (ref 0.7–3.1)
LYMPHOCYTES NFR BLD AUTO: 23 % (ref 19.6–45.3)
MCH RBC QN AUTO: 32.5 PG (ref 26.6–33)
MCHC RBC AUTO-ENTMCNC: 35.3 G/DL (ref 31.5–35.7)
MCV RBC AUTO: 92.2 FL (ref 79–97)
MONOCYTES # BLD AUTO: 0.76 10*3/MM3 (ref 0.1–0.9)
MONOCYTES NFR BLD AUTO: 11.7 % (ref 5–12)
NEUTROPHILS NFR BLD AUTO: 4.09 10*3/MM3 (ref 1.7–7)
NEUTROPHILS NFR BLD AUTO: 62.6 % (ref 42.7–76)
NRBC BLD AUTO-RTO: 0 /100 WBC (ref 0–0.2)
PLATELET # BLD AUTO: 266 10*3/MM3 (ref 140–450)
PMV BLD AUTO: 12.2 FL (ref 6–12)
POTASSIUM SERPL-SCNC: 4.2 MMOL/L (ref 3.5–5.2)
PROT SERPL-MCNC: 6.9 G/DL (ref 6–8.5)
RBC # BLD AUTO: 4.34 10*6/MM3 (ref 3.77–5.28)
SODIUM SERPL-SCNC: 139 MMOL/L (ref 136–145)
TIBC SERPL-MCNC: 368 MCG/DL (ref 298–536)
TRANSFERRIN SERPL-MCNC: 247 MG/DL (ref 200–360)
TRIGL SERPL-MCNC: 136 MG/DL (ref 0–150)
TSH SERPL DL<=0.05 MIU/L-ACNC: 1.56 UIU/ML (ref 0.27–4.2)
VIT B12 BLD-MCNC: >2000 PG/ML (ref 211–946)
VLDLC SERPL-MCNC: 24 MG/DL (ref 5–40)
WBC NRBC COR # BLD AUTO: 6.52 10*3/MM3 (ref 3.4–10.8)

## 2024-10-15 PROCEDURE — 82306 VITAMIN D 25 HYDROXY: CPT | Performed by: FAMILY MEDICINE

## 2024-10-15 PROCEDURE — 85025 COMPLETE CBC W/AUTO DIFF WBC: CPT | Performed by: FAMILY MEDICINE

## 2024-10-15 PROCEDURE — 36415 COLL VENOUS BLD VENIPUNCTURE: CPT | Performed by: FAMILY MEDICINE

## 2024-10-15 PROCEDURE — 82607 VITAMIN B-12: CPT | Performed by: FAMILY MEDICINE

## 2024-10-15 PROCEDURE — 84443 ASSAY THYROID STIM HORMONE: CPT | Performed by: FAMILY MEDICINE

## 2024-10-15 PROCEDURE — 80061 LIPID PANEL: CPT | Performed by: FAMILY MEDICINE

## 2024-10-15 PROCEDURE — 83036 HEMOGLOBIN GLYCOSYLATED A1C: CPT | Performed by: FAMILY MEDICINE

## 2024-10-15 PROCEDURE — 84466 ASSAY OF TRANSFERRIN: CPT | Performed by: FAMILY MEDICINE

## 2024-10-15 PROCEDURE — 82728 ASSAY OF FERRITIN: CPT | Performed by: FAMILY MEDICINE

## 2024-10-15 PROCEDURE — 99396 PREV VISIT EST AGE 40-64: CPT | Performed by: FAMILY MEDICINE

## 2024-10-15 PROCEDURE — 80053 COMPREHEN METABOLIC PANEL: CPT | Performed by: FAMILY MEDICINE

## 2024-10-15 PROCEDURE — 83540 ASSAY OF IRON: CPT | Performed by: FAMILY MEDICINE

## 2024-10-15 RX ORDER — PROGESTERONE 200 MG/1
200 CAPSULE ORAL DAILY
COMMUNITY

## 2024-10-15 RX ORDER — ESTRADIOL 0.5 MG/.5G
GEL TOPICAL
COMMUNITY

## 2024-10-15 NOTE — PROGRESS NOTES
Subjective   Chief Complaint   Patient presents with    Annual Exam     Maggie Alvarez is a 49 y.o. female.     Patient Care Team:  Olga Carrington MD as PCP - General (Family Medicine)  Juan Jose Forbes MD as Consulting Physician (Gastroenterology)  Makenna Dash MD as Consulting Physician (Obstetrics and Gynecology)  Siri Talley APRN (Nurse Practitioner)    History of Present Illness  She is coming in today for her annual wellness exam and she is also due for fasting blood work.  Patient reports that in 1/2024 she started doing plant based diet and overall focusing on healthy lifestyle.  She has lost some weight since then and feels very good about the weight loss.  Patient is followed by gynecologist for her checkups and she tells me that in 1/2024 she was started on hormone replacement therapy.  Her last menstrual period was about 4 years ago, she started having some vaginal spotting in 8/2024 and contacted her gynecologist.  She ultimately underwent hysteroscopy and biopsy earlier this month and she is waiting for the results.  She tells me that she was started on hormone replacement therapy due to pretty bad hot flashes and it is helping.  Patient was diagnosed with hemochromatosis several years ago and she has done some blood donation through [employer], she reports that when she tried to do it in May of this year she was advised that her hemoglobin was rather lower than required for her to be able to donate blood. Patient does not report any chest pain, shortness of breath, dizziness, nausea, vomiting, or diarrhea, visual issues, headaches, numbness or tingling. No urinary issues reported like urgency, frequency, or discomfort upon urination.  No significant weight changes reported.  No swelling reported.  No rashes or any other skin issues reported.        The following portions of the patient's history were reviewed and updated as appropriate: allergies, current medications, past family  history, past medical history, past social history, past surgical history, and problem list.  Past Medical History:   Diagnosis Date    Allergic rhinitis     Anxiety     Arthritis 2015    thumbs/knees    Depression     GERD (gastroesophageal reflux disease) 2008    Had surgery in 2016 to correct    Hemochromatosis     Hormone replacement therapy (HRT) 01/2024    per her GYN - Dr. Dash    Hyperlipidemia     Hypertension     IBS (irritable bowel syndrome)     Interstitial cystitis     Migraines     Pelvic floor dysfunction     Postmenopausal bleeding 08/2024    evaluated by her GYN - Dr. Dash, Hysteroscopy in 10/2024     Past Surgical History:   Procedure Laterality Date    APPENDECTOMY      BREAST IMPLANT SURGERY      BREAST SURGERY  2004    COLONOSCOPY  2008, 2021    HIATAL HERNIA REPAIR  12/28/2016    with DARRELL Pillai     The patient has a family history of  Family History   Problem Relation Age of Onset    Hyperlipidemia Mother     Diabetes Mother     Heart disease Mother         congestive heart failure    Depression Mother     Miscarriages / Stillbirths Mother         miscarriage    Vision loss Mother         cataract surgery    Arthritis Mother     Hemochromatosis Brother     Diabetes Father     Diabetes Maternal Grandfather     Heart disease Maternal Grandfather         bypass surgery    Arthritis Maternal Grandmother     Depression Maternal Grandmother     Hearing loss Maternal Grandmother     Hyperlipidemia Maternal Grandmother     Miscarriages / Stillbirths Maternal Grandmother         multiple miscarriages and a stillbirth    Arthritis Paternal Grandmother     Heart disease Paternal Grandmother         bypass surgery    Hyperlipidemia Paternal Grandmother     Stroke Paternal Grandmother     Cancer Paternal Grandmother         ovarian cancer    Cancer Maternal Aunt         breast cancer    Diabetes Maternal Aunt     Heart disease Maternal Aunt         bypass surgery    Hyperlipidemia Maternal Aunt      Depression Sister     Arthritis Brother     Depression Brother      Social History     Socioeconomic History    Marital status:    Tobacco Use    Smoking status: Never    Smokeless tobacco: Never    Tobacco comments:     Around heavy smoke environments some years   Vaping Use    Vaping status: Never Used   Substance and Sexual Activity    Alcohol use: Yes     Alcohol/week: 4.0 standard drinks of alcohol     Types: 4 Cans of beer per week     Comment: Not every week    Drug use: Never    Sexual activity: Yes     Partners: Male     Birth control/protection: None     Comment: Same partner 12 years       Review of Systems   Constitutional:  Negative for activity change, appetite change, chills, fatigue, fever, unexpected weight gain and unexpected weight loss.   HENT:  Negative for congestion, ear pain, hearing loss, nosebleeds, postnasal drip, swollen glands, tinnitus and trouble swallowing.    Eyes:  Negative for blurred vision, double vision and visual disturbance.   Respiratory:  Negative for cough, choking, chest tightness, shortness of breath, wheezing and stridor.    Cardiovascular:  Negative for chest pain, palpitations and leg swelling.   Gastrointestinal:  Negative for abdominal distention, abdominal pain, anal bleeding, constipation, diarrhea, nausea, vomiting and GERD.   Endocrine: Negative for cold intolerance, heat intolerance and polyphagia.   Genitourinary:  Negative for dysuria, flank pain, frequency, hematuria and urgency.   Musculoskeletal:  Negative for arthralgias, back pain, gait problem, joint swelling and neck pain.   Skin:  Negative for color change, dry skin and skin lesions.   Allergic/Immunologic: Negative for environmental allergies and food allergies.   Neurological:  Negative for dizziness, tremors, speech difficulty, light-headedness, numbness, headache and confusion.   Hematological:  Negative for adenopathy. Does not bruise/bleed easily.   Psychiatric/Behavioral:  Negative for  "decreased concentration, sleep disturbance, depressed mood and stress.      Visit Vitals  /78 (BP Location: Left arm, Patient Position: Sitting, Cuff Size: Adult)   Pulse 80   Temp 98 °F (36.7 °C) (Temporal)   Resp 18   Ht 167.6 cm (66\")   Wt 67.1 kg (148 lb)   LMP 11/20/2021   SpO2 96%   BMI 23.89 kg/m²       BMI is >= 25 and <30. (Overweight) The following options were offered after discussion;: exercise counseling/recommendations      Current Outpatient Medications:     Cetirizine HCl (ZyrTEC Allergy) 10 MG capsule, ZYRTEC ALLERGY 10 MG CAPS, Disp: , Rfl:     Cholecalciferol (Vitamin D) 50 MCG (2000 UT) capsule, VITAMIN D 2000 UNIT CAPS, Disp: , Rfl:     Cyanocobalamin (Vitamin B-12) 1000 MCG sublingual tablet, VITAMIN B-12 1000 MCG SUBL, Disp: , Rfl:     fluticasone (FLONASE) 50 MCG/ACT nasal spray, FLONASE 50 MCG/ACT NASAL SUSPENSION, Disp: , Rfl:     Misc Natural Products (OSTEO BI-FLEX/5-LOXIN ADVANCED PO), Take  by mouth., Disp: , Rfl:     Probiotic Product (Align) 4 MG capsule, ALIGN 4 MG CAPS, Disp: , Rfl:     estradiol 0.5 MG/0.5GM gel, Place  on the skin as directed by provider., Disp: , Rfl:     FLUoxetine (PROzac) 20 MG capsule, Take 1 capsule by mouth Daily., Disp: , Rfl:     Progesterone (PROMETRIUM) 200 MG capsule, Take 1 capsule by mouth Daily., Disp: , Rfl:     Vortioxetine HBr (Trintellix) 5 MG tablet tablet, Take 1 tablet by mouth Daily With Breakfast., Disp: , Rfl:     Objective   Physical Exam  Vitals and nursing note reviewed.   Constitutional:       General: She is not in acute distress.     Appearance: She is well-developed. She is not diaphoretic.   HENT:      Head: Normocephalic and atraumatic.      Right Ear: External ear normal.      Left Ear: External ear normal.   Eyes:      General: No scleral icterus.        Right eye: No discharge.         Left eye: No discharge.      Conjunctiva/sclera: Conjunctivae normal.      Pupils: Pupils are equal, round, and reactive to light.   Neck: "      Thyroid: No thyromegaly.      Vascular: No JVD.   Cardiovascular:      Rate and Rhythm: Normal rate and regular rhythm.      Heart sounds: Normal heart sounds. No murmur heard.     No friction rub. No gallop.   Pulmonary:      Effort: Pulmonary effort is normal. No respiratory distress.      Breath sounds: Normal breath sounds. No stridor. No wheezing, rhonchi or rales.   Abdominal:      General: Bowel sounds are normal. There is no distension.      Palpations: Abdomen is soft. There is no mass.      Tenderness: There is no abdominal tenderness. There is no guarding or rebound.      Hernia: No hernia is present.   Musculoskeletal:         General: No swelling, tenderness or deformity. Normal range of motion.      Cervical back: Normal range of motion and neck supple. No muscular tenderness.      Right lower leg: No edema.      Left lower leg: No edema.   Lymphadenopathy:      Cervical: No cervical adenopathy.   Skin:     General: Skin is warm and dry.      Capillary Refill: Capillary refill takes less than 2 seconds.      Coloration: Skin is not pale.      Findings: No bruising, erythema, lesion or rash.   Neurological:      General: No focal deficit present.      Mental Status: She is alert and oriented to person, place, and time.      Cranial Nerves: No cranial nerve deficit.      Sensory: No sensory deficit.      Motor: No weakness.      Coordination: Coordination normal.      Deep Tendon Reflexes: Reflexes normal.   Psychiatric:         Mood and Affect: Mood normal.         Behavior: Behavior normal.         Judgment: Judgment normal.           Assessment & Plan   Diagnoses and all orders for this visit:    1. Preventative health care (Primary)  -     Comprehensive Metabolic Panel  -     Lipid Panel  -     CBC Auto Differential  -     TSH  -     Vitamin D,25-Hydroxy  -     Hemoglobin A1c  -     Ferritin  -     Iron Profile  -     Vitamin B12    2. Visit for screening mammogram  -     Mammo Screening Digital  Tomosynthesis Bilateral With CAD; Future    3. Screening for cardiovascular condition  -     CT Cardiac Calcium Score Without Dye; Future  -     Vascular Screening (Bundle) CAR; Future        Wellness exam was done today - see above for details. Healthy life style was reviewed and discussed and re-enforced. Regular exercise and healthy diet were also discussed and recommended.  I will be getting fasting blood work.  Her last colonoscopy was in 11/2021 and 10-year follow-up colonoscopy was recommended.  Her last mammogram was in 9/2023 and the new order for mammogram was given and patient will be scheduling the test.  She gets her Pap smears done through her gynecologist and she reports to be up to speed.  We also discussed cardiovascular screening and patient wishes to proceed.  I also reviewed her immunization records, patient already had her first Shingrix shot and she will be scheduling her second Shingrix shot through the pharmacy.  Otherwise she is up to speed with her vaccination.        Return in about 1 year (around 10/15/2025) for Annual physical.    Requested Prescriptions      No prescriptions requested or ordered in this encounter       Olga Carrington MD  10/15/2024

## 2024-12-23 ENCOUNTER — HOSPITAL ENCOUNTER (OUTPATIENT)
Dept: CARDIOLOGY | Facility: HOSPITAL | Age: 49
Discharge: HOME | End: 2024-12-23

## 2024-12-23 ENCOUNTER — HOSPITAL ENCOUNTER (OUTPATIENT)
Dept: CT IMAGING | Facility: HOSPITAL | Age: 49
Discharge: HOME | End: 2024-12-23

## 2024-12-23 DIAGNOSIS — Z13.6 SCREENING FOR CARDIOVASCULAR CONDITION: ICD-10-CM

## 2024-12-23 LAB
BH CV VAS SCREENING CAROTID CCA LEFT: 70 CM/SEC
BH CV VAS SCREENING CAROTID CCA RIGHT: 78 CM/SEC
BH CV VAS SCREENING CAROTID ICA LEFT: 89 CM/SEC
BH CV VAS SCREENING CAROTID ICA RIGHT: 93 CM/SEC
BH CV XLRA MEAS - MID AO DIAM: 1.4 CM
BH CV XLRA MEAS - PAD LEFT ABI PT: 1.17
BH CV XLRA MEAS - PAD LEFT ARM: 115 MMHG
BH CV XLRA MEAS - PAD LEFT LEG PT: 134 MMHG
BH CV XLRA MEAS - PAD RIGHT ABI PT: 1.2
BH CV XLRA MEAS - PAD RIGHT ARM: 114 MMHG
BH CV XLRA MEAS - PAD RIGHT LEG PT: 138 MMHG
BH CV XLRA MEAS LEFT DIST CCA EDV: 21.6 CM/SEC
BH CV XLRA MEAS LEFT DIST CCA PSV: 70.3 CM/SEC
BH CV XLRA MEAS LEFT ICA/CCA RATIO: 1.3
BH CV XLRA MEAS LEFT PROX ICA EDV: -29 CM/SEC
BH CV XLRA MEAS LEFT PROX ICA PSV: -88.5 CM/SEC
BH CV XLRA MEAS RIGHT DIST CCA EDV: 28.6 CM/SEC
BH CV XLRA MEAS RIGHT DIST CCA PSV: 78.3 CM/SEC
BH CV XLRA MEAS RIGHT ICA/CCA RATIO: 1.2
BH CV XLRA MEAS RIGHT PROX ICA EDV: -31.7 CM/SEC
BH CV XLRA MEAS RIGHT PROX ICA PSV: -92.6 CM/SEC

## 2024-12-23 PROCEDURE — 93799 UNLISTED CV SVC/PROCEDURE: CPT

## 2024-12-23 PROCEDURE — 75571 CT HRT W/O DYE W/CA TEST: CPT
